# Patient Record
Sex: FEMALE | Race: WHITE | HISPANIC OR LATINO | Employment: FULL TIME | URBAN - METROPOLITAN AREA
[De-identification: names, ages, dates, MRNs, and addresses within clinical notes are randomized per-mention and may not be internally consistent; named-entity substitution may affect disease eponyms.]

---

## 2017-02-26 ENCOUNTER — HOSPITAL ENCOUNTER (EMERGENCY)
Facility: HOSPITAL | Age: 32
Discharge: HOME | End: 2017-02-26
Attending: EMERGENCY MEDICINE
Payer: COMMERCIAL

## 2017-02-26 VITALS
TEMPERATURE: 98 F | DIASTOLIC BLOOD PRESSURE: 56 MMHG | HEART RATE: 72 BPM | SYSTOLIC BLOOD PRESSURE: 115 MMHG | HEIGHT: 62 IN | RESPIRATION RATE: 18 BRPM | OXYGEN SATURATION: 97 % | WEIGHT: 175 LBS | BODY MASS INDEX: 32.2 KG/M2

## 2017-02-26 DIAGNOSIS — M54.41 CHRONIC BILATERAL LOW BACK PAIN WITH RIGHT-SIDED SCIATICA: Primary | ICD-10-CM

## 2017-02-26 DIAGNOSIS — G89.29 CHRONIC BILATERAL LOW BACK PAIN WITH RIGHT-SIDED SCIATICA: Primary | ICD-10-CM

## 2017-02-26 LAB
B-HCG UR QL: NEGATIVE
BACTERIA #/AREA URNS HPF: NORMAL /HPF
BILIRUB UR QL STRIP: NEGATIVE
CLARITY UR: ABNORMAL
COLOR UR: YELLOW
CTP QC/QA: YES
GLUCOSE UR QL STRIP: NEGATIVE
HGB UR QL STRIP: ABNORMAL
KETONES UR QL STRIP: NEGATIVE
LEUKOCYTE ESTERASE UR QL STRIP: NEGATIVE
MICROSCOPIC COMMENT: NORMAL
NITRITE UR QL STRIP: NEGATIVE
PH UR STRIP: 6 [PH] (ref 5–8)
PROT UR QL STRIP: NEGATIVE
RBC #/AREA URNS HPF: 2 /HPF (ref 0–4)
SP GR UR STRIP: >=1.03 (ref 1–1.03)
SQUAMOUS #/AREA URNS HPF: 2 /HPF
URN SPEC COLLECT METH UR: ABNORMAL
UROBILINOGEN UR STRIP-ACNC: 1 EU/DL
WBC #/AREA URNS HPF: 0 /HPF (ref 0–5)

## 2017-02-26 PROCEDURE — 96372 THER/PROPH/DIAG INJ SC/IM: CPT

## 2017-02-26 PROCEDURE — 81025 URINE PREGNANCY TEST: CPT | Performed by: EMERGENCY MEDICINE

## 2017-02-26 PROCEDURE — 99283 EMERGENCY DEPT VISIT LOW MDM: CPT | Mod: 25

## 2017-02-26 PROCEDURE — 81000 URINALYSIS NONAUTO W/SCOPE: CPT

## 2017-02-26 PROCEDURE — 63600175 PHARM REV CODE 636 W HCPCS: Performed by: PHYSICIAN ASSISTANT

## 2017-02-26 RX ORDER — DIAZEPAM 5 MG/1
5 TABLET ORAL EVERY 6 HOURS PRN
Qty: 10 TABLET | Refills: 0 | Status: SHIPPED | OUTPATIENT
Start: 2017-02-26 | End: 2017-02-27 | Stop reason: ALTCHOICE

## 2017-02-26 RX ORDER — IBUPROFEN 600 MG/1
600 TABLET ORAL EVERY 6 HOURS PRN
Qty: 20 TABLET | Refills: 0 | Status: SHIPPED | OUTPATIENT
Start: 2017-02-26 | End: 2017-02-27 | Stop reason: ALTCHOICE

## 2017-02-26 RX ORDER — KETOROLAC TROMETHAMINE 30 MG/ML
30 INJECTION, SOLUTION INTRAMUSCULAR; INTRAVENOUS
Status: COMPLETED | OUTPATIENT
Start: 2017-02-26 | End: 2017-02-26

## 2017-02-26 RX ORDER — DIAZEPAM 10 MG/2ML
5 INJECTION INTRAMUSCULAR
Status: COMPLETED | OUTPATIENT
Start: 2017-02-26 | End: 2017-02-26

## 2017-02-26 RX ADMIN — DIAZEPAM 5 MG: 5 INJECTION, SOLUTION INTRAMUSCULAR; INTRAVENOUS at 11:02

## 2017-02-26 RX ADMIN — KETOROLAC TROMETHAMINE 30 MG: 30 INJECTION, SOLUTION INTRAMUSCULAR at 11:02

## 2017-02-26 NOTE — ED PROVIDER NOTES
"Encounter Date: 2/26/2017       History     Chief Complaint   Patient presents with    Back Pain     lower back pain and radiates to lower pelvic area and bilateral knees; states has history of sciatica; described as "spasms"; took ibuprofen, naproxen and tylenol and flexeril with no relief; denies urinary symptoms     Review of patient's allergies indicates:  No Known Allergies  HPI Comments:  Harsha Asi 31 y.o. nontoxic/afebrile female presented to the ED with C/O low back pain for the past four months. She denies any trauma to the area and reports that the pain is a spasm sensation that waxes and wanes in intensity. She states that pain is exacerbated by certain movements, bending and prolonged sitting and now radiates to the right gluteal region. She states that she does have left hip pain secondary to her changes gait. She is followed by a chiropractor with some relief of symptoms but has not seen PCP regarding this pain. She denies any numbness, tingling, fever, chills, abdominal pain, GI, or  symptoms, bowel or bladder incontinence. She took aleve, flexeril and heating pad with minimal relief.    The history is provided by the patient.     Past Medical History:   Diagnosis Date    Diabetes mellitus      History reviewed. No pertinent surgical history.  Family History   Problem Relation Age of Onset    Diabetes Father     Hypertension Father     Diabetes Mother      Social History   Substance Use Topics    Smoking status: Never Smoker    Smokeless tobacco: Never Used    Alcohol use No     Review of Systems   Constitutional: Positive for activity change. Negative for appetite change, chills and fever.   HENT: Negative for sore throat.    Respiratory: Negative for cough and shortness of breath.    Cardiovascular: Negative for chest pain.   Gastrointestinal: Negative for abdominal pain, constipation, diarrhea, nausea and vomiting.   Genitourinary: Negative for dysuria, flank pain, pelvic pain, vaginal " bleeding and vaginal discharge.   Musculoskeletal: Positive for back pain and gait problem. Negative for arthralgias, joint swelling, neck pain and neck stiffness.   Skin: Negative for rash.   Neurological: Negative for weakness and numbness.   Hematological: Does not bruise/bleed easily.       Physical Exam   Initial Vitals   BP Pulse Resp Temp SpO2   02/26/17 1002 02/26/17 1002 02/26/17 1002 02/26/17 1002 02/26/17 1002   139/78 96 18 98.1 °F (36.7 °C) 98 %     Physical Exam    Nursing note and vitals reviewed.  Constitutional: Vital signs are normal. She appears well-developed and well-nourished. She is cooperative.  Non-toxic appearance. She does not appear ill. No distress.   HENT:   Head: Normocephalic and atraumatic.   Eyes: Conjunctivae and lids are normal.   Neck: Neck supple. No rigidity.   Cardiovascular: Normal rate and regular rhythm.   Pulmonary/Chest: Breath sounds normal. No respiratory distress. She has no wheezes. She has no rhonchi.   Abdominal: Soft. Normal appearance and bowel sounds are normal. There is no tenderness. There is no rigidity and no guarding.   Musculoskeletal:        Lumbar back: She exhibits tenderness, pain and spasm. She exhibits normal range of motion, no bony tenderness, no swelling and no edema.        Back:    Negative straight leg raise; no midline tenderness or bony deformity   Neurological: She is alert and oriented to person, place, and time. She has normal strength. No sensory deficit. GCS eye subscore is 4. GCS verbal subscore is 5. GCS motor subscore is 6.   Skin: Skin is warm, dry and intact. No rash noted.   Psychiatric: She has a normal mood and affect. Her speech is normal and behavior is normal. Thought content normal.         ED Course   Procedures  Labs Reviewed   URINALYSIS - Abnormal; Notable for the following:        Result Value    Appearance, UA Hazy (*)     Specific Gravity, UA >=1.030 (*)     Occult Blood UA 1+ (*)     All other components within normal  limits   URINALYSIS MICROSCOPIC   POCT URINE PREGNANCY       Dhyam Asi 31 y.o. nontoxic/afebrile female presented to the ED with C/O low back pain for the past four months. She denies any trauma to the area and reports that the pain is a spasm sensation that waxes and wanes in intensity. She states that pain is exacerbated by certain movements, bending and prolonged sitting and now radiates to the right gluteal region. She states that she does have left hip pain secondary to her changes gait. She is followed by a chiropractor with some relief of symptoms but has not seen PCP regarding this pain. She denies any numbness, tingling, fever, chills, abdominal pain, GI, or  symptoms, bowel or bladder incontinence. She took aleve, flexeril and heating pad with minimal relief. ROS positive for back pain.  Physical exam reveals patient well appearing in no distress exhibiting smooth steady gait to room. FROM of neck and all extremities with strength 5/55 bilaterally. TTP of the right lower paraspinal muscles with spasm noted; no midline tenderness, bony deformity or step-off. Lungs clear, heart regular rate and rhythm. Abdomen is soft and nontender with no rebound or rigidity.     DDX: back spasm, fracture, dislocation    ED management: no imaging at this time as low suspicion for acute bony deformity. We will treat with symptomatic medications for back spasm. Encouraged rest, hot soaks, massage and follow up for possible outpatient MRI and will refer to PT    Impression/Plan: The encounter diagnosis was Chronic bilateral low back pain with right-sided sciatica.  Discharged with valium and motrin.  Patient will follow up with Primary.  Patient cautioned on when to return to ED.  Pt. Understands and agrees with current treatment plan                        Attending Attestation:     Physician Attestation Statement for NP/PA:   I have conducted a face to face encounter with this patient in addition to the NP/PA, due to NP/PA  Request    Other NP/PA Attestation Additions:    History of Present Illness: 31-year-old female with back pain.  Pain began 4 months ago.  No trauma.  Right   Physical Exam: Tenderness of the right lower lumbar paraspinous muscle extending onto the right upper buttock.                  ED Course     Clinical Impression:   The encounter diagnosis was Chronic bilateral low back pain with right-sided sciatica.          ZULEMA Ornelas  02/26/17 1300       Lauri Lopez MD  02/26/17 6333

## 2017-02-26 NOTE — DISCHARGE INSTRUCTIONS
Consejos Para El Cuidado De La Espalda [Back Care Tips]    Hay ciertas cosas que usted puede hacer para prevenir la recurrencia del dolor de espalda velasquez y reducir los síntomas del dolor de espalda crónico:  · Mantenga un peso saludable. Si tiene sobrepeso, perder peso le ayudará a aliviar la mayoría de los jeri de espalda.  · El ejercicio es fariba parte importante de la recuperación de los jeri de espalda. La espalda está soportada por los músculos que se encuentran detrás y gume de la columna vertebral. [place] significa que los músculos de la espalda y del abdomen deben fortalecerse para quinn mejor soporte a la columna vertebral.   · Nadar y caminar a paso rápido son buenas formas de ejercicio para mejorar santana nivel de forma física.  · Practique técnicas seguras para levantar objetos (simón la información que se presenta más abajo).  · Adopte posturas correctas al estar sentado, parado o caminando. Evite permanecer sentado asiya períodos largos, ya que esto implica más esfuerzo en la parte inferior de la espalda que cuando está de pie o caminando.  · Póngase zapatos de buena calidad con suficiente soporte del arco. La alineación de los pies y de los tobillos puede afectar los síntomas de la espalda. Las mujeres deben evitar los zapatos de tacón alto.  · Los masajes terapéuticos pueden ayudar a aliviar el dolor de espalda crónico.  · Asiya los primeros dos días después de fariba lesión severa o un ataque de dolor crónico de espalda, aplique fariba bolsa de hielo sobre la bertha dolorida asiya 20 minutos cada 2-4 horas. [place] reducirá la hinchazón y el dolor. El calor (duchas o ophelia en [place] o fariba almohadilla calefactora) ayudan a reducir los espasmos musculares. Puede comenzar aplicando hielo y luego aplicar calor al cabo de dos días. Algunos pacientes se sienten mejor alternando los tratamientos con hielo y con calor. Utilice el método que le dé mejor resultado.  · Puede usar acetaminofén (Tylenol) o  ibuprofeno (Motrin o Advil) para controlar el dolor, a menos que le hayan recetado otro medicamento.[NOTA: Si tiene fariba enfermedad hepática o renal crónica, o ha tenido alguna vez fariba úlcera estomacal o sangrado gastrointestinal, consulte con santana médico antes de maria del carmen estos medicamentos.]     Estiramiento Lumbar  Brea es un ejercicio simple de estiramiento que le ayudará a relajar los espasmos musculares y a mantener la espalda más flexible. Si el ejercicio le empeora el dolor de espalda, deje de hacerlo.  · Acuéstese boca arriba con las rodillas flexionadas y ambos pies apoyados en el piso.  · Levante lentamente la rodilla izquierda hacia el pecho, con la espalda plana contra el piso. Sostenga la posición asiya 5 segundos.  · Relájese y repita el ejercicio con la rodilla derecha.  · Repita el ejercicio 10 veces con cada pierna.  Técnica Para Levantar Objetos De Forma Álvarez  · No doble la cintura para levantar objetos del suelo. En vez de hacer eso, agáchese doblando las rodillas y las caderas.   · Mantenga la espalda recta y la yanet erguida.   · Agarre el objeto cerca del cuerpo, directamente frente a usted.  · Enderece las piernas para levantar el objeto.   · Para bajar el objeto, siga la misma técnica en orden inverso.  · Si tiene que arrastrar un objeto por el suelo, empújelo.  Consejos Para Mantener Fariba [place] Postura  SENTADO  Siéntese en pooja con respaldos rectos o con soporte para la parte inferior de la espalda. Mantenga las rodillas un poco más elevadas que las caderas. En liam necesario, use un banquito o taburete bajo para mantener los pies apoyados sobre fariba superficie sólida.  Siéntese con la espalda recta mientras maneja. Ajuste el asiento hacia adelante para no tener que inclinarse hacia el volante. Fariba almohada pequeña o fariba toalla enrollada detrás de la parte inferior de la espalda podría ser útil para conducir en viajes largos.   DE PIE  Cuando tenga que estar parado (de pie) asiya largos  períodos apoye la mayor parte del peso sobre fariba pierna, cambiando de pierna cada pocos minutos.   AL DORMIR  La mejor manera de dormir es de lado, con las rodillas dobladas. Apoye la yanet en fariba almohada baja para quinn soporte al benito de forma que la columna vertebral quede en posición neutral. Evite las almohadas demasiado gruesas que doblan el benito hacia un lado. [place] fariba almohada entre las piernas para relajar más la parte inferior de la espalda. Si duerme boca arriba, ponga almohadas debajo de las rodillas para mantener las piernas ligeramente flexionadas. Use un colchón firme. Si santana colchón se hunde en la parte donde duerme, cámbielo o use fariba tabla de gann contrachapada de media pulgada de espesor debajo del colchón para darle más soporte.  Programe fariba VISITA DE CONTROL con santana médico, o según le indique nuestro personal médico.  [NOTA: Si le hicieron radiografías, tomografías computarizadas o resonancias magnéticas, estas serán examinadas por un radiólogo y le informarán de los nuevos hallazgos que puedan afectar la atención médica que necesita.]  Regrese Sin Demora  o llame al médico si nota alguno de los siguientes síntomas:  · El dolor empeora o se extiende a los brazos o a las piernas  · Debilidad o entumecimiento en carly o ambos brazos o piernas.  · Pérdida de control del intestino o de la vejiga  · Entumecimiento en la bertha de las ingles  Date Last Reviewed: 6/1/2016  © 1211-0499 [email or web address]. [address] [place] [address], [place] [address]. Todos los derechos reservados. Esta información no pretende sustituir la atención médica profesional. Sólo santana médico puede diagnosticar y tratar un problema de jay.          Ejercicios Para Fortalecer La Parte Baja De La Espalda [Back Exercises]  Si están ranjit, los músculos de la parte baja de la espalda y los abdominales pueden actuar conjuntamente para brindar un buen apoyo a la columna. Los ejercicios descritos a continuación le ayudarán a  fortalecer los músculos de la parte baja de la espalda. Es importante que comience a hacer ejercicios lentamente y que aumente los niveles de intensidad poco a poco.  Comience siempre cualquier programa de ejercicios estirando los músculos. Si siente dolor mientras hace alguno de estos ejercicios, deténgase y consulte con santana médico, para que le recomiende un programa específico más adecuado a santana nivel de forma física.  Estiramiento De La Parte Baja De La Espalda  · Acuéstese boca arriba con las rodillas flexionadas y ambos pies apoyados en el piso.  · Levante lentamente la rodilla izquierda hacia el pecho, conforme aplana la espalda contra el piso. Sostenga esta posición asiya 5 segundos.  · Relájese y repita el ejercicio con la rodilla derecha.  · Rigoberto juan ejercicio 10 veces con cada pierna.  · Repita el ejercicio abrazando las dos rodillas y presionándolas contra el pecho al mismo tiempo.  Desarrollo De La Fuerza En La Parte Baja De La Espalda  1. Extensión lumbar de rodillas: Comience en posición de cuatro patas. Levante y enderece simultáneamente el brazo derecho y la pierna izquierda hasta que ambos miembros estén paralelos al suelo. Sostenga esta posición asiya 2 segundos y vuelva lentamente al punto de stefani. Repita el ejercicio con el brazo nicole y la pierna derecha. Alterne las posiciones 10 veces.  2. Extensión lumbar boca abajo: Acuéstese boca abajo, con los brazos extendidos hacia arriba y las svetlana contra el piso. Levante simultáneamente santana brazo derecho y santana pierna izquierda hasta donde pueda elevarlos sin sentir molestias. Sostenga esta posición asiya 10 segundos y vuelva lentamente al punto de stefani. Repita el ejercicio con el brazo nicole y la pierna derecha. Alterne las posiciones 10 veces. Prolongue gradualmente juan ejercicio hasta repetirlo 20 veces. (Nivel avanzado: Repita juan ejercicio levantando los dos brazos y las dos piernas al mismo tiempo hasta que estén a unos  cuantos centímetros del piso. Sostenga esta posición por 5 segundos y relájese.)  3. Inclinación pélvica: Acuéstese boca arriba en el suelo, con las rodillas flexionadas a 90 grados. Deje los pies apoyados contra el piso. Inspire, espire y luego contraiga lentamente los músculos abdominales llevando el ombligo hacia la columna vertebral. Deje que la pelvis se mueva hacia atrás hasta que la parte baja de la espalda esté completamente apoyada en el piso. Sostenga esta posición asiya 10 segundos mientras respira normalmente.  4. Abdominales cortos: Realice un ejercicio de inclinación pélvica (explicado arriba) apoyando la parte baja de la espalda contra el suelo. Mientras mantiene la tensión de joaquin músculos abdominales, respire fariba vez más y levante los omóplatos del piso (esta posición no equivale a la de sentadilla completa). Mantenga la yanet alineada con el mauricio del cuerpo (no doble el benito hacia gume). Sostenga la posición por 2 segundos, luego baje lentamente.  Date Last Reviewed: 6/1/2016  © 2366-4918 [employer]. [address] [place] [id number]. Todos los derechos reservados. Esta información no pretende sustituir la atención médica profesional. Sólo santana médico puede diagnosticar y tratar un problema de jay.

## 2017-02-26 NOTE — ED TRIAGE NOTES
"lower back pain and radiates to lower pelvic area and bilateral knees; states has history of sciatica; described as "spasms"; took ibuprofen, naproxen and tylenol and flexeril with no relief; denies urinary symptoms  Pt states symptoms have been intermittent x 1 mth. 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   "

## 2017-02-26 NOTE — ED AVS SNAPSHOT
[facility]  [address]  [place] [address]               Dhyam Asi   2017 10:08 AM   ED    Descripción:  Female : 1985   Departamento:  [facility]           Garcia Cuidado fue coordinado por:     Provider Role From To    Lauri Lopez MD Attending Provider [phone] --    ZULEMA Ornelas Physician Assistant 17 [phone] --      Razón de la erick     Back Pain           Diagnósticos de Esta Visita        Comentarios    Chronic bilateral low back pain with right-sided sciatica    -  Primario       ED Disposition     Ninguna           Lista de tareas           Información de seguimiento     Realice un seguimiento con:  Amadeo Orozco MD    Cómo:  Ir a    Cuándo:  3/5/2017    Especialidad:  Internal Medicine    Información de contacto:    [address] [place] [address] [place] [address]  [phone]        Recetas para recoger        Disp Refills Start End    ibuprofen (ADVIL,MOTRIN) 600 MG tablet 20 tablet 0 2017     Take 1 tablet (600 mg total) by mouth every 6 (six) hours as needed for Pain. - Oral    [facility]: [facility] [address] [place] [address] No. de tlfo: #: [phone]       diazePAM (VALIUM) 5 MG tablet 10 tablet 0 2017 3/28/2017    Take 1 tablet (5 mg total) by mouth every 6 (six) hours as needed (muslce relaxant). - Oral    [facility]: [facility] [address] [place] [address] No. de tlfo: #: [phone]         [facility]     [facility] L[facility] de Enfermeras - Asistencia   Enfermeras registradas de [facility] pueden ayudarle a reservar fariba erick, proveer educación para la jay, asesoría clínica, y otros servicios de asesoramiento.   Llame para juan servicio gratuito a [phone].             Medicamentos           Mensaje sobre Medicamentos     Verificar los cambios y / o adiciones a garcia régimen de medicación son los mismos que discutir con garcia médico. Si cualquiera de  estos cambios o adiciones son incorrectos, por favor notifique a santana proveedor de atención médica.        EMPEZAR a maria del carmen estos medicamentos NUEVOS        Refills    ibuprofen (ADVIL,MOTRIN) 600 MG tablet 0    Sig: Take 1 tablet (600 mg total) by mouth every 6 (six) hours as needed for Pain.    Categoría: Print    Vía: Oral    diazePAM (VALIUM) 5 MG tablet 0    Sig: Take 1 tablet (5 mg total) by mouth every 6 (six) hours as needed (muslce relaxant).    Categoría: Print    Vía: Oral      These medications were administered today        Dose Freq    ketorolac injection 30 mg 30 mg ED 1 Time    Sig: Inject 30 mg into the muscle ED 1 Time.    Categoría: Normal    Vía: Intramuscular    Cofirmante de órdenes: Required by Lauri Lopez MD    diazePAM injection 5 mg 5 mg ED 1 Time    Sig: Inject 1 mL (5 mg total) into the muscle ED 1 Time.    Categoría: Normal    Vía: Intramuscular    Cofirmante de órdenes: Required by Lauri Lopez MD           Verifique que la siguiente lista de medicamentos es fariba representación exacta de los medicamentos que está tomando actualmente. Si no hay ningunos reportados, la lista puede estar en randolph. Si no es correcta, por favor póngase en contacto con santana proveedor de atención médica. Lleve esta lista con usted en liam de emergencia.           Medicamentos Actuales     diazePAM (VALIUM) 5 MG tablet Take 1 tablet (5 mg total) by mouth every 6 (six) hours as needed (muslce relaxant).    diazePAM injection 5 mg Inject 1 mL (5 mg total) into the muscle ED 1 Time.    drospirenone-ethinyl estradiol (RACHEL, 28,) 3-0.03 mg per tablet Take 1 tablet by mouth once daily.    ibuprofen (ADVIL,MOTRIN) 600 MG tablet Take 1 tablet (600 mg total) by mouth every 6 (six) hours as needed for Pain.    ketorolac injection 30 mg Inject 30 mg into the muscle ED 1 Time.           Información de referencia clínica           Tomasa signos vitales laurel     PS                   139/78           Alergias     A  partir del:  2/26/2017        No Known Allergies      Vacunas     Administradas en la fecha de la visita:  2/26/2017        None      ED Micro, Lab, POCT     Start Ordered       Status Ordering Provider    02/26/17 1012 02/26/17 1012  POCT urine pregnancy  Once      Final result     02/26/17 1012 02/26/17 1012  Urinalysis  STAT      Final result     02/26/17 1012 02/26/17 1012  Urinalysis Microscopic  Once      Final result       ED Imaging Orders     None        Instrucciones a quinn de enrique         Consejos Para El Cuidado De La Espalda [Back Care Tips]    Hay ciertas cosas que usted puede hacer para prevenir la recurrencia del dolor de espalda velasquez y reducir los síntomas del dolor de espalda crónico:  · Mantenga un peso saludable. Si tiene sobrepeso, perder peso le ayudará a aliviar la mayoría de los jeri de espalda.  · El ejercicio es fariba parte importante de la recuperación de los jeri de espalda. La espalda está soportada por los músculos que se encuentran detrás y gume de la columna vertebral. [place] significa que los músculos de la espalda y del abdomen deben fortalecerse para quinn mejor soporte a la columna vertebral.   · Nadar y caminar a paso rápido son buenas formas de ejercicio para mejorar santana nivel de forma física.  · Practique técnicas seguras para levantar objetos (simón la información que se presenta más abajo).  · Adopte posturas correctas al estar sentado, parado o caminando. Evite permanecer sentado asiya períodos largos, ya que esto implica más esfuerzo en la parte inferior de la espalda que cuando está de pie o caminando.  · Póngase zapatos de buena calidad con suficiente soporte del arco. La alineación de los pies y de los tobillos puede afectar los síntomas de la espalda. Las mujeres deben evitar los zapatos de tacón alto.  · Los masajes terapéuticos pueden ayudar a aliviar el dolor de espalda crónico.  · Asiya los primeros dos días después de fariba lesión severa o un ataque de dolor  crónico de espalda, aplique fariba bolsa de hielo sobre la bertha dolorida asiya 20 minutos cada 2-4 horas. Zemple reducirá la hinchazón y el dolor. El calor (duchas o ophelia en [employer] o fariba almohadilla calefactora) ayudan a reducir los espasmos musculares. Puede comenzar aplicando hielo y luego aplicar calor al cabo de dos días. Algunos pacientes se sienten mejor alternando los tratamientos con hielo y con calor. Utilice el método que le dé mejor resultado.  · Puede usar acetaminofén (Tylenol) o ibuprofeno (Motrin o Advil) para controlar el dolor, a menos que le hayan recetado otro medicamento.[NOTA: Si tiene fariba enfermedad hepática o renal crónica, o ha tenido alguna vez fariba úlcera estomacal o sangrado gastrointestinal, consulte con santana médico antes de maria del carmen estos medicamentos.]     Estiramiento Lumbar  Brea es un ejercicio simple de estiramiento que le ayudará a relajar los espasmos musculares y a mantener la espalda más flexible. Si el ejercicio le empeora el dolor de espalda, deje de hacerlo.  · Acuéstese boca arriba con las rodillas flexionadas y ambos pies apoyados en el piso.  · Levante lentamente la rodilla izquierda hacia el pecho, con la espalda plana contra el piso. Sostenga la posición asiya 5 segundos.  · Relájese y repita el ejercicio con la rodilla derecha.  · Repita el ejercicio 10 veces con cada pierna.  Técnica Para Levantar Objetos De Forma Álvarez  · No doble la cintura para levantar objetos del suelo. En vez de hacer eso, agáchese doblando las rodillas y las caderas.   · Mantenga la espalda recta y la yanet erguida.   · Agarre el objeto cerca del cuerpo, directamente frente a usted.  · Enderece las piernas para levantar el objeto.   · Para bajar el objeto, siga la misma técnica en orden inverso.  · Si tiene que arrastrar un objeto por el suelo, empújelo.  Consejos Para Mantener Fariba [place] Postura  SENTADO  Siéntese en pooja con respaldos rectos o con soporte para la parte inferior de la  espalda. Mantenga las rodillas un poco más elevadas que las caderas. En liam necesario, use un banquito o taburete bajo para mantener los pies apoyados sobre milli superficie sólida.  Siéntese con la espalda recta mientras maneja. Ajuste el asiento hacia adelante para no tener que inclinarse hacia el volante. Milli almohada pequeña o milli toalla enrollada detrás de la parte inferior de la espalda podría ser útil para conducir en viajes largos.   DE PIE  Cuando tenga que estar parado (de pie) asiya largos períodos apoye la mayor parte del peso sobre milli pierna, cambiando de pierna cada pocos minutos.   AL DORMIR  La mejor manera de dormir es de lado, con las rodillas dobladas. Apoye la yanet en milli almohada baja para quinn soporte al benito de forma que la columna vertebral quede en posición neutral. Evite las almohadas demasiado gruesas que doblan el benito hacia un lado. Ponga milli almohada entre las piernas para relajar más la parte inferior de la espalda. Si duerme boca arriba, ponga almohadas debajo de las rodillas para mantener las piernas ligeramente flexionadas. Use un colchón firme. Si santana colchón se hunde en la parte donde duerme, cámbielo o use milli tabla de gann contrachapada de media pulgada de espesor debajo del colchón para darle más soporte.  Programe milli VISITA DE CONTROL con santana médico, o según le indique nuestro personal médico.  [NOTA: Si le hicieron radiografías, tomografías computarizadas o resonancias magnéticas, estas serán examinadas por un radiólogo y le informarán de los nuevos hallazgos que puedan afectar la atención médica que necesita.]  Regrese Sin Demora  o llame al médico si nota alguno de los siguientes síntomas:  · El dolor empeora o se extiende a los brazos o a las piernas  · Debilidad o entumecimiento en carly o ambos brazos o piernas.  · Pérdida de control del intestino o de la vejiga  · Entumecimiento en la bertha de las ingles  Date Last Reviewed: 6/1/2016  © 5337-1207 [employer] [address] [place] [address]. Todos los derechos reservados. Esta información no pretende sustituir la atención médica profesional. Sólo santana médico puede diagnosticar y tratar un problema de jay.          Ejercicios Para Fortalecer La Parte Baja De La Espalda [Back Exercises]  Si están ranjit, los músculos de la parte baja de la espalda y los abdominales pueden actuar conjuntamente para brindar un buen apoyo a la columna. Los ejercicios descritos a continuación le ayudarán a fortalecer los músculos de la parte baja de la espalda. Es importante que comience a hacer ejercicios lentamente y que aumente los niveles de intensidad poco a poco.  Comience siempre cualquier programa de ejercicios estirando los músculos. Si siente dolor mientras hace alguno de estos ejercicios, deténgase y consulte con santana médico, para que le recomiende un programa específico más adecuado a santana nivel de forma física.  Estiramiento De La Parte Baja De La Espalda  · Acuéstese boca arriba con las rodillas flexionadas y ambos pies apoyados en el piso.  · Levante lentamente la rodilla izquierda hacia el pecho, conforme aplana la espalda contra el piso. Sostenga esta posición asiya 5 segundos.  · Relájese y repita el ejercicio con la rodilla derecha.  · Rigoberto juan ejercicio 10 veces con cada pierna.  · Repita el ejercicio abrazando las dos rodillas y presionándolas contra el pecho al mismo tiempo.  Desarrollo De La Fuerza En La Parte Baja De La Espalda  1. Extensión lumbar de rodillas: Comience en posición de cuatro patas. Levante y enderece simultáneamente el brazo derecho y la pierna izquierda hasta que ambos miembros estén paralelos al suelo. Sostenga esta posición asiya 2 segundos y vuelva lentamente al punto de stefani. Repita el ejercicio con el brazo nicole y la pierna derecha. Alterne las posiciones 10 veces.  2. Extensión lumbar boca abajo: Acuéstese boca abajo, con los brazos extendidos hacia arriba y las  svetlana contra el piso. Levante simultáneamente santana brazo derecho y santana pierna izquierda hasta donde pueda elevarlos sin sentir molestias. Sostenga esta posición asiya 10 segundos y vuelva lentamente al punto de stefani. Repita el ejercicio con el brazo nicole y la pierna derecha. Alterne las posiciones 10 veces. Prolongue gradualmente juan ejercicio hasta repetirlo 20 veces. (Nivel avanzado: Repita juan ejercicio levantando los dos brazos y las dos piernas al mismo tiempo hasta que estén a unos cuantos centímetros del piso. Sostenga esta posición por 5 segundos y relájese.)  3. Inclinación pélvica: Acuéstese boca arriba en el suelo, con las rodillas flexionadas a 90 grados. Deje los pies apoyados contra el piso. Inspire, espire y luego contraiga lentamente los músculos abdominales llevando el ombligo hacia la columna vertebral. Deje que la pelvis se mueva hacia atrás hasta que la parte baja de la espalda esté completamente apoyada en el piso. Sostenga esta posición asiya 10 segundos mientras respira normalmente.  4. Abdominales cortos: Realice un ejercicio de inclinación pélvica (explicado arriba) apoyando la parte baja de la espalda contra el suelo. Mientras mantiene la tensión de tomasa músculos abdominales, respire fariba vez más y levante los omóplatos del piso (esta posición no equivale a la de sentadilla completa). Mantenga la yanet alineada con el mauricio del cuerpo (no doble el benito hacia gume). Sostenga la posición por 2 segundos, luego baje lentamente.  Date Last Reviewed: 6/1/2016  © 6582-2321 [employer], [employer]. [address] [place] [address], [place] [address]. Todos los derechos reservados. Esta información no pretende sustituir la atención médica profesional. Sólo santana médico puede diagnosticar y tratar un problema de jay.          Tomasa Citas Programadas     Feb 27, 2017  1:00 PM CST   Physical with Eunice Faustin MD   [facility] ([place])    [address]  [place] [address] [phone]              Registrarse para [facility]     La activación de santana cuenta [facility] es tan fácil perri 1-2-3!    1) Ir a [email or web address], seleccione Registrarse Ahora, meter el código de activación y santana fecha de nacimiento, y seleccione Próximo.    [id number]  Expires: 3/23/2017  8:54 AM      2) Crear un nombre de usuario y contraseña para usar cuando se visita [facility] en el futuro y selecciona fariba pregunta de seguridad en liam de que pierda santana contraseña y seleccione Próximo.    3) Introduzca santana dirección de correo electrónico y melissa clic en Registrarse!    Información Adicional  Si tiene alguna pregunta, por favor, e-mail [email or web address] o llame al [phone] para hablar con nuestro personal. Recuerde, [facility] no debe ser usada para necesidades urgentes. En liam de emergencia médica, llame al [phone].         [facility] cumple con las leyes federales aplicables de derechos civiles y no discrimina por motivos de angy, color, origen nacional, edad, discapacidad, o sexo.        Language Assistance Services     ATTENTION: Language assistance services are available, free of charge. Please call [phone].      ATENCIÓN: Si habla español, tiene a santana disposición servicios gratuitos de asistencia lingüística. Llame al [phone].     [facility] Ý: N?u b?n nói Ti?ng Vi?t, có các d?ch v? h? tr? ngôn ng? mi?n phí dành cho b?n. G?i s? [phone].                      [facility] [address]  [place] [address]               [facility] Asi   2017 10:08 AM   ED    Description:  Female : 1985   Department:  [facility]           Your Care was Coordinated By:     Provider Role From To    Lauri Lopez MD Attending Provider [phone] --    ZULEMA Ornelas Physician Assistant 17 [phone] --      Reason for Visit     Back Pain           Diagnoses this Visit        Comments     Chronic bilateral low back pain with right-sided sciatica    -  Primary       ED Disposition     None           To Do List           Follow-up Information     Follow up with Amadeo Orozco MD. Go in 1 week.    Specialty:  Internal Medicine    Contact information:    [address] [id number] [phone]         These Medications        Disp Refills Start End    ibuprofen (ADVIL,MOTRIN) 600 MG tablet 20 tablet 0 2/26/2017     Take 1 tablet (600 mg total) by mouth every 6 (six) hours as needed for Pain. - Oral    Pharmacy: [facility] [address] #: [phone]       diazePAM (VALIUM) 5 MG tablet 10 tablet 0 2/26/2017 3/28/2017    Take 1 tablet (5 mg total) by mouth every 6 (six) hours as needed (muslce relaxant). - Oral    Pharmacy: [address] [place] [address] #: [phone]         [facility]     [facility] - 24/7 Assistance  Registered nurses in the [facility] provide clinical advisement, health education, appointment booking, and other advisory services.  Call for this free service at [phone].             Medications           Message regarding Medications     Verify the changes and/or additions to your medication regime listed below are the same as discussed with your clinician today.  If any of these changes or additions are incorrect, please notify your healthcare provider.        START taking these NEW medications        Refills    ibuprofen (ADVIL,MOTRIN) 600 MG tablet 0    Sig: Take 1 tablet (600 mg total) by mouth every 6 (six) hours as needed for Pain.    Class: Print    Route: Oral    diazePAM (VALIUM) 5 MG tablet 0    Sig: Take 1 tablet (5 mg total) by mouth every 6 (six) hours as needed (muslce relaxant).    Class: Print    Route: Oral      These medications were administered today        Dose Freq    ketorolac injection 30 mg 30 mg ED 1 Time    Sig: Inject 30 mg into the  muscle ED 1 Time.    Class: Normal    Route: Intramuscular    Cosign for Ordering: Required by Lauri Lopez MD    diazePAM injection 5 mg 5 mg ED 1 Time    Sig: Inject 1 mL (5 mg total) into the muscle ED 1 Time.    Class: Normal    Route: Intramuscular    Cosign for Ordering: Required by Lauri Lopez MD           Verify that the below list of medications is an accurate representation of the medications you are currently taking.  If none reported, the list may be blank. If incorrect, please contact your healthcare provider. Carry this list with you in case of emergency.           Current Medications     diazePAM (VALIUM) 5 MG tablet Take 1 tablet (5 mg total) by mouth every 6 (six) hours as needed (muslce relaxant).    diazePAM injection 5 mg Inject 1 mL (5 mg total) into the muscle ED 1 Time.    drospirenone-ethinyl estradiol (RACHEL, 28,) 3-0.03 mg per tablet Take 1 tablet by mouth once daily.    ibuprofen (ADVIL,MOTRIN) 600 MG tablet Take 1 tablet (600 mg total) by mouth every 6 (six) hours as needed for Pain.    ketorolac injection 30 mg Inject 30 mg into the muscle ED 1 Time.           Clinical Reference Information           Your Vitals Were     BP                   139/78           Allergies as of 2/26/2017     No Known Allergies      Immunizations Administered on Date of Encounter - 2/26/2017     None      ED Micro, Lab, POCT     Start Ordered       Status Ordering Provider    02/26/17 1012 02/26/17 1012  POCT urine pregnancy  Once      Final result     02/26/17 1012 02/26/17 1012  Urinalysis  STAT      Final result     02/26/17 1012 02/26/17 1012  Urinalysis Microscopic  Once      Final result       ED Imaging Orders     None        Discharge Instructions         Consejos Para El Cuidado De La Espalda [Back Care Tips]    Hay ciertas cosas que usted puede hacer para prevenir la recurrencia del dolor de espalda velasquez y reducir los síntomas del dolor de espalda crónico:  · Mantenga un peso  saludable. Si tiene sobrepeso, perder peso le ayudará a aliviar la mayoría de los jeri de espalda.  · El ejercicio es fariba parte importante de la recuperación de los jeri de espalda. La espalda está soportada por los músculos que se encuentran detrás y gume de la columna vertebral. Britt significa que los músculos de la espalda y del abdomen deben fortalecerse para quinn mejor soporte a la columna vertebral.   · Nadar y caminar a paso rápido son buenas formas de ejercicio para mejorar santana nivel de forma física.  · Practique técnicas seguras para levantar objetos (simón la información que se presenta más abajo).  · Adopte posturas correctas al estar sentado, parado o caminando. Evite permanecer sentado asiya períodos largos, ya que esto implica más esfuerzo en la parte inferior de la espalda que cuando está de pie o caminando.  · Póngase zapatos de buena calidad con suficiente soporte del arco. La alineación de los pies y de los tobillos puede afectar los síntomas de la espalda. Las mujeres deben evitar los zapatos de tacón alto.  · Los masajes terapéuticos pueden ayudar a aliviar el dolor de espalda crónico.  · Asiya los primeros dos días después de fariba lesión severa o un ataque de dolor crónico de espalda, aplique fariba bolsa de hielo sobre la bertha dolorida asiya 20 minutos cada 2-4 horas. Britt reducirá la hinchazón y el dolor. El calor (duchas o ophelia en [place] o fariba almohadilla calefactora) ayudan a reducir los espasmos musculares. Puede comenzar aplicando hielo y luego aplicar calor al cabo de dos días. Algunos pacientes se sienten mejor alternando los tratamientos con hielo y con calor. Utilice el método que le dé mejor resultado.  · Puede usar acetaminofén (Tylenol) o ibuprofeno (Motrin o Advil) para controlar el dolor, a menos que le hayan recetado otro medicamento.[NOTA: Si tiene fariba enfermedad hepática o renal crónica, o ha tenido alguna vez fariba úlcera estomacal o sangrado gastrointestinal,  consulte con santana médico antes de maria del carmen estos medicamentos.]     Estiramiento Lumbar  Brea es un ejercicio simple de estiramiento que le ayudará a relajar los espasmos musculares y a mantener la espalda más flexible. Si el ejercicio le empeora el dolor de espalda, deje de hacerlo.  · Acuéstese boca arriba con las rodillas flexionadas y ambos pies apoyados en el piso.  · Levante lentamente la rodilla izquierda hacia el pecho, con la espalda plana contra el piso. Sostenga la posición asiya 5 segundos.  · Relájese y repita el ejercicio con la rodilla derecha.  · Repita el ejercicio 10 veces con cada pierna.  Técnica Para Levantar Objetos De Forma Álvarez  · No doble la cintura para levantar objetos del suelo. En vez de hacer eso, agáchese doblando las rodillas y las caderas.   · Mantenga la espalda recta y la yanet erguida.   · Agarre el objeto cerca del cuerpo, directamente frente a usted.  · Enderece las piernas para levantar el objeto.   · Para bajar el objeto, siga la misma técnica en orden inverso.  · Si tiene que arrastrar un objeto por el suelo, empújelo.  Consejos Para Mantener Milli [place] Postura  SENTADO  Siéntese en pooja con respaldos rectos o con soporte para la parte inferior de la espalda. Mantenga las rodillas un poco más elevadas que las caderas. En liam necesario, use un banquito o taburete bajo para mantener los pies apoyados sobre milli superficie sólida.  Siéntese con la espalda recta mientras maneja. Ajuste el asiento hacia adelante para no tener que inclinarse hacia el volante. Milli almohada pequeña o milli toalla enrollada detrás de la parte inferior de la espalda podría ser útil para conducir en viajes largos.   DE PIE  Cuando tenga que estar parado (de pie) asiya largos períodos apoye la mayor parte del peso sobre milli pierna, cambiando de pierna cada pocos minutos.   AL DORMIR  La mejor manera de dormir es de lado, con las rodillas dobladas. Apoye la yanet en milli almohada baja para quinn soporte al  benito de forma que la columna vertebral quede en posición neutral. Evite las almohadas demasiado gruesas que doblan el benito hacia un lado. Ponga fariba almohada entre las piernas para relajar más la parte inferior de la espalda. Si duerme boca arriba, ponga almohadas debajo de las rodillas para mantener las piernas ligeramente flexionadas. Use un colchón firme. Si santana colchón se hunde en la parte donde duerme, cámbielo o use fariba tabla de gann contrachapada de media pulgada de espesor debajo del colchón para darle más soporte.  Programe fariba VISITA DE CONTROL con santana médico, o según le indique nuestro personal médico.  [NOTA: Si le hicieron radiografías, tomografías computarizadas o resonancias magnéticas, estas serán examinadas por un radiólogo y le informarán de los nuevos hallazgos que puedan afectar la atención médica que necesita.]  Regrese Sin Demora  o llame al médico si nota alguno de los siguientes síntomas:  · El dolor empeora o se extiende a los brazos o a las piernas  · Debilidad o entumecimiento en carly o ambos brazos o piernas.  · Pérdida de control del intestino o de la vejiga  · Entumecimiento en la bertha de las ingles  Date Last Reviewed: 6/1/2016  © 5897-6814 [employer]. [address] [place] [address], [place] [address]. Todos los derechos reservados. Esta información no pretende sustituir la atención médica profesional. Sólo santana médico puede diagnosticar y tratar un problema de jay.          Ejercicios Para Fortalecer La Parte Baja De La Espalda [Back Exercises]  Si están ranjit, los músculos de la parte baja de la espalda y los abdominales pueden actuar conjuntamente para brindar un buen apoyo a la columna. Los ejercicios descritos a continuación le ayudarán a fortalecer los músculos de la parte baja de la espalda. Es importante que comience a hacer ejercicios lentamente y que aumente los niveles de intensidad poco a poco.  Comience siempre cualquier programa de ejercicios estirando los  músculos. Si siente dolor mientras hace alguno de estos ejercicios, deténgase y consulte con santana médico, para que le recomiende un programa específico más adecuado a santana nivel de forma física.  Estiramiento De La Parte Baja De La Espalda  · Acuéstese boca arriba con las rodillas flexionadas y ambos pies apoyados en el piso.  · Levante lentamente la rodilla izquierda hacia el pecho, conforme aplana la espalda contra el piso. Sostenga esta posición asiya 5 segundos.  · Relájese y repita el ejercicio con la rodilla derecha.  · Rigoberto juan ejercicio 10 veces con cada pierna.  · Repita el ejercicio abrazando las dos rodillas y presionándolas contra el pecho al mismo tiempo.  Desarrollo De La Fuerza En La Parte Baja De La Espalda  1. Extensión lumbar de rodillas: Comience en posición de cuatro patas. Levante y enderece simultáneamente el brazo derecho y la pierna izquierda hasta que ambos miembros estén paralelos al suelo. Sostenga esta posición asiya 2 segundos y vuelva lentamente al punto de stefani. Repita el ejercicio con el brazo nicole y la pierna derecha. Alterne las posiciones 10 veces.  2. Extensión lumbar boca abajo: Acuéstese boca abajo, con los brazos extendidos hacia arriba y las svetlana contra el piso. Levante simultáneamente santana brazo derecho y santana pierna izquierda hasta donde pueda elevarlos sin sentir molestias. Sostenga esta posición asiya 10 segundos y vuelva lentamente al punto de stefani. Repita el ejercicio con el brazo nicole y la pierna derecha. Alterne las posiciones 10 veces. Prolongue gradualmente juan ejercicio hasta repetirlo 20 veces. (Nivel avanzado: Repita juan ejercicio levantando los dos brazos y las dos piernas al mismo tiempo hasta que estén a unos cuantos centímetros del piso. Sostenga esta posición por 5 segundos y relájese.)  3. Inclinación pélvica: Acuéstese boca arriba en el suelo, con las rodillas flexionadas a 90 grados. Deje los pies apoyados contra el piso. Inspire, espire  y luego contraiga lentamente los músculos abdominales llevando el ombligo hacia la columna vertebral. Deje que la pelvis se mueva hacia atrás hasta que la parte baja de la espalda esté completamente apoyada en el piso. Sostenga esta posición asiya 10 segundos mientras respira normalmente.  4. Abdominales cortos: Realice un ejercicio de inclinación pélvica (explicado arriba) apoyando la parte baja de la espalda contra el suelo. Mientras mantiene la tensión de joaquin músculos abdominales, respire fariba vez más y levante los omóplatos del piso (esta posición no equivale a la de sentadilla completa). Mantenga la yanet alineada con el mauricio del cuerpo (no doble el benito hacia gume). Sostenga la posición por 2 segundos, luego baje lentamente.  Date Last Reviewed: 6/1/2016  © 1722-8726 [employer]. [address] [place] [address]. Todos los derechos reservados. Esta información no pretende sustituir la atención médica profesional. Sólo santana médico puede diagnosticar y tratar un problema de jay.          Your Scheduled Appointments     Feb 27, 2017  1:00 PM CST   Physical with Eunice Faustin MD   [place] [facility]    [address] [facility]  [place] [address]   [phone]              [facility] Sign-Up     Activating your [facility] account is as easy as 1-2-3!     1) Visit [email or web address], select Sign Up Now, enter this activation code and your date of birth, then select Next.  [email or web address]  Expires: 3/23/2017  8:54 AM      2) Create a username and password to use when you visit [facility] in the future and select a security question in case you lose your password and select Next.    3) Enter your e-mail address and click Sign Up!    Additional Information  If you have questions, please e-mail [email or web address] or call [phone] to talk to our [facility] staff. Remember, [facility] is NOT to be used for urgent needs. For medical emergencies, dial 911.           [facility] complies with applicable Federal civil rights laws and does not discriminate on the basis of race, color, national origin, age, disability, or sex.        Language Assistance Services     ATTENTION: Language assistance services are available, free of charge. Please call [phone].      ATENCIÓN: Si habla español, tiene a santana disposición servicios gratuitos de asistencia lingüística. Llame al [phone].     CHÚ Ý: N?u b?n nói Ti?ng Vi?t, có các d?ch v? h? tr? ngôn ng? mi?n phí dành cho b?n. G?i s? [phone].

## 2017-02-27 ENCOUNTER — OFFICE VISIT (OUTPATIENT)
Dept: INTERNAL MEDICINE | Facility: CLINIC | Age: 32
End: 2017-02-27
Payer: COMMERCIAL

## 2017-02-27 ENCOUNTER — HOSPITAL ENCOUNTER (OUTPATIENT)
Dept: RADIOLOGY | Facility: HOSPITAL | Age: 32
Discharge: HOME | End: 2017-02-27
Attending: INTERNAL MEDICINE
Payer: COMMERCIAL

## 2017-02-27 VITALS
HEART RATE: 80 BPM | BODY MASS INDEX: 32.22 KG/M2 | SYSTOLIC BLOOD PRESSURE: 120 MMHG | TEMPERATURE: 57 F | RESPIRATION RATE: 14 BRPM | HEIGHT: 62 IN | DIASTOLIC BLOOD PRESSURE: 64 MMHG | WEIGHT: 175.06 LBS

## 2017-02-27 DIAGNOSIS — Z86.32 HISTORY OF GESTATIONAL DIABETES: ICD-10-CM

## 2017-02-27 DIAGNOSIS — M79.18 MUSCULOSKELETAL PAIN: ICD-10-CM

## 2017-02-27 DIAGNOSIS — M54.2 NECK PAIN ON RIGHT SIDE: ICD-10-CM

## 2017-02-27 DIAGNOSIS — M54.50 CHRONIC MIDLINE LOW BACK PAIN WITHOUT SCIATICA: ICD-10-CM

## 2017-02-27 DIAGNOSIS — Z00.00 ANNUAL PHYSICAL EXAM: Primary | ICD-10-CM

## 2017-02-27 DIAGNOSIS — G89.29 CHRONIC MIDLINE LOW BACK PAIN WITHOUT SCIATICA: ICD-10-CM

## 2017-02-27 DIAGNOSIS — R20.0 NUMBNESS OF TOES: ICD-10-CM

## 2017-02-27 DIAGNOSIS — E66.9 OBESITY (BMI 30.0-34.9): ICD-10-CM

## 2017-02-27 PROCEDURE — 72050 X-RAY EXAM NECK SPINE 4/5VWS: CPT | Mod: TC,PO

## 2017-02-27 PROCEDURE — 99385 PREV VISIT NEW AGE 18-39: CPT | Mod: S$GLB,,, | Performed by: INTERNAL MEDICINE

## 2017-02-27 PROCEDURE — 72050 X-RAY EXAM NECK SPINE 4/5VWS: CPT | Mod: 26,,, | Performed by: RADIOLOGY

## 2017-02-27 PROCEDURE — 99999 PR PBB SHADOW E&M-EST. PATIENT-LVL III: CPT | Mod: PBBFAC,,, | Performed by: INTERNAL MEDICINE

## 2017-02-27 PROCEDURE — 72110 X-RAY EXAM L-2 SPINE 4/>VWS: CPT | Mod: 26,,, | Performed by: RADIOLOGY

## 2017-02-27 PROCEDURE — 72110 X-RAY EXAM L-2 SPINE 4/>VWS: CPT | Mod: TC,PO

## 2017-02-27 RX ORDER — METHOCARBAMOL 750 MG/1
750 TABLET, FILM COATED ORAL 2 TIMES DAILY PRN
Qty: 30 TABLET | Refills: 0 | Status: SHIPPED | OUTPATIENT
Start: 2017-02-27 | End: 2017-03-09

## 2017-02-27 RX ORDER — DICLOFENAC SODIUM 75 MG/1
75 TABLET, DELAYED RELEASE ORAL 2 TIMES DAILY PRN
Qty: 30 TABLET | Refills: 0 | Status: SHIPPED | OUTPATIENT
Start: 2017-02-27 | End: 2017-04-07

## 2017-02-27 NOTE — PROGRESS NOTES
Subjective:      Harsha Castellanos is a 31 y.o.yo female who presents for annual exam.    Family History:  family history includes Breast cancer (age of onset: 42) in her paternal aunt; Cancer in her mother; Diabetes in her father and mother; Goiter in her maternal aunt; Hypertension in her father; Thyroid disease in her maternal aunt and paternal aunt.    Health Maintenance:  Health Maintenance       Date Due Completion Date    Lipid Panel 1985 ---    Hemoglobin A1c 1985 ---    Foot Exam 7/23/1995 ---    Eye Exam 7/23/1995 ---    Pap Smear 7/23/2006 ---    Influenza Vaccine 8/1/2016 ---    TETANUS VACCINE 2/26/2024 2/26/2014      OB/GYN 2015  Eye exam: not recently  Dental Exam: no issues    Exercise: limited by chronic back pain  Diet: overall healthy  Body mass index is 32.02 kg/(m^2).      Meds:   Current Outpatient Prescriptions:     diclofenac (VOLTAREN) 75 MG EC tablet, Take 1 tablet (75 mg total) by mouth 2 (two) times daily as needed., Disp: 30 tablet, Rfl: 0    drospirenone-ethinyl estradiol (RACHEL, 28,) 3-0.03 mg per tablet, Take 1 tablet by mouth once daily., Disp: 30 tablet, Rfl: 11    methocarbamol (ROBAXIN) 750 MG Tab, Take 1 tablet (750 mg total) by mouth 2 (two) times daily as needed., Disp: 30 tablet, Rfl: 0    PMHx:   Past Medical History:   Diagnosis Date    Gestational diabetes        PSHx:   No past surgical history on file.    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   HENT: Negative for congestion, dental  problem, ear discharge, ear pain, mouth sores, postnasal drip, rhinorrhea, sinus pressure, tinnitus and trouble swallowing.    Eyes: Negative for visual disturbance.   Respiratory: Negative for cough, chest tightness, shortness of breath and wheezing.    Cardiovascular: Negative for chest pain, palpitations and leg swelling.   Gastrointestinal: Negative for abdominal pain, blood in stool, constipation, diarrhea, nausea and vomiting.   Endocrine: Negative for cold intolerance, heat intolerance, polydipsia and polyuria.   Genitourinary: Negative for decreased urine volume, difficulty urinating, dysuria and hematuria.   Musculoskeletal: Positive for back pain (ER on 2/26 due to severe back pain, treated with ibuprofen and valium with minimal improvement, currently 6-7/10 in severity, radiates to bilateral groin and upper thighs, sees chiropractor, plans to begin PT) and neck pain (7/10 in severity, right sided). Negative for arthralgias and myalgias.   Skin: Negative for rash.   Neurological: Positive for numbness (intermittent toe numbness and tingling). Negative for dizziness, weakness, light-headedness and headaches.   Hematological: Negative for adenopathy.       Objective:      Physical Exam   Constitutional: She is oriented to person, place, and time. Vital signs are normal. She appears well-developed and well-nourished. No distress.   HENT:   Head: Normocephalic and atraumatic.   Right Ear: Hearing, tympanic membrane, external ear and ear canal normal. Tympanic membrane is not erythematous and not bulging.   Left Ear: Hearing, tympanic membrane, external ear and ear canal normal. Tympanic membrane is not erythematous and not bulging.   Nose: Nose normal.   Mouth/Throat: Uvula is midline, oropharynx is clear and moist and mucous membranes are normal. No oropharyngeal exudate or posterior oropharyngeal erythema.   Eyes: Conjunctivae, EOM and lids are normal. Pupils are equal, round, and reactive to light. No  scleral icterus.   Neck: Normal range of motion. Neck supple. No thyroid mass and no thyromegaly present.   Cardiovascular: Normal rate, regular rhythm, normal heart sounds and intact distal pulses.    No murmur heard.  Pulmonary/Chest: Effort normal and breath sounds normal. She has no wheezes.   Abdominal: Soft. Bowel sounds are normal. She exhibits no distension. There is no hepatosplenomegaly. There is no tenderness. There is no rigidity, no rebound and no guarding.   Musculoskeletal: Normal range of motion. She exhibits no edema.        Cervical back: She exhibits tenderness (inmusculature on right), pain and spasm.        Thoracic back: She exhibits no bony tenderness, no pain and no spasm.        Lumbar back: She exhibits bony tenderness, pain and spasm.   Negative straight leg raise   Lymphadenopathy:     She has no cervical adenopathy.        Right: No supraclavicular adenopathy present.        Left: No supraclavicular adenopathy present.   Neurological: She is alert and oriented to person, place, and time. She has normal strength and normal reflexes. She displays no tremor. No sensory deficit. Coordination and gait normal.   Skin: Skin is warm, dry and intact. No rash noted. She is not diaphoretic.   Psychiatric: She has a normal mood and affect.   Vitals reviewed.      Assessment:       1. Annual physical exam    2. Musculoskeletal pain    3. Chronic midline low back pain without sciatica    4. Numbness of toes    5. Neck pain on right side    6. Obesity (BMI 30.0-34.9)    7. History of gestational diabetes        Plan:       1,7. Ordered CBC, CMP, TSH, HbA1c, lipid panel and U/A. Up to date with vaccinations.   2,3,4. X-ray lumbar spine, stop ibuprofen, start voltaren 75mg twice daily as needed for pain. Also stop valium. Begin Robaxin 750mg up to bid as needed but advised not to drive after taking a dose since it may cause drowsiness. Has first PT session scheduled on 3/7.   5. c-spine x-ray  6. Patient  plans to resume exercise program after back pain resolves, will start PT    RTC in 1 year or sooner if needed    Eunice Faustin MD

## 2017-02-27 NOTE — MR AVS SNAPSHOT
[place] [facility]   [facility]  [place] [address]  Phone: [phone]  Fax: [phone]                  Dhyam Asi   2017 1:00 PM   Office Visit    Descripción:  Female : 1985   Personal Médico:  Eunice Faustin MD   Departamento:  [place] [facility]           Razón de la erick     Establish Care           Diagnósticos de Esta Visita        Comentarios    Annual physical exam    -  Primario     Chronic midline low back pain without sciatica         Neck pain on right side         Numbness of toes         History of gestational diabetes         Musculoskeletal pain                Lista de tareas           Citas próximas        Personal Médico Departamento Tfno del dpto    3/7/2017 3:30 PM Delgado Santiago PT [facility] [phone]      Metas (5 Years of Data)     Ninguna      Recetas para recoger        Disp Refills Start End    diclofenac (VOLTAREN) 75 MG EC tablet 30 tablet 0 2017     Take 1 tablet (75 mg total) by mouth 2 (two) times daily as needed. - Oral    [facility] [employer] [address] [place] [address] [address] AT [facility] No. de tlfo: #: [phone]       methocarbamol (ROBAXIN) 750 MG Tab 30 tablet 0 2017 3/9/2017    Take 1 tablet (750 mg total) by mouth 2 (two) times daily as needed. - Oral    [facility]: [facility] [employer] [address] [place] [address], [place] - [address] AT [facility] No. de tlfo: #: [phone]         [facility]     [facility] [place] [facility] Enfermeras - Asistencia   Enfermeras registradas de [facility] pueden ayudarle a reservar fariba erick, proveer educación para la jay, asesoría clínica, y otros servicios de asesoramiento.   Llame para juan servicio gratuito a [phone].             Medicamentos           Mensaje sobre Medicamentos     Verificar los cambios y / o adiciones a santana régimen de medicación son los mismos que  discutir con santana médico. Si cualquiera de estos cambios o adiciones son incorrectos, por favor notifique a santana proveedor de atención médica.        EMPEZAR a maria del carmen estos medicamentos NUEVOS        Refills    diclofenac (VOLTAREN) 75 MG EC tablet 0    Sig: Take 1 tablet (75 mg total) by mouth 2 (two) times daily as needed.    Categoría: Normal    Vía: Oral    methocarbamol (ROBAXIN) 750 MG Tab 0    Sig: Take 1 tablet (750 mg total) by mouth 2 (two) times daily as needed.    Categoría: Normal    Vía: Oral      DEJAR de maria del carmen estos medicamentos     ibuprofen (ADVIL,MOTRIN) 600 MG tablet Take 1 tablet (600 mg total) by mouth every 6 (six) hours as needed for Pain.    diazePAM (VALIUM) 5 MG tablet Take 1 tablet (5 mg total) by mouth every 6 (six) hours as needed (muslce relaxant).           Verifique que la siguiente lista de medicamentos es fariba representación exacta de los medicamentos que está tomando actualmente. Si no hay ningunos reportados, la lista puede estar en randolph. Si no es correcta, por favor póngase en contacto con santana proveedor de atención médica. Lleve esta lista con usted en liam de emergencia.           Medicamentos Actuales     diclofenac (VOLTAREN) 75 MG EC tablet Take 1 tablet (75 mg total) by mouth 2 (two) times daily as needed.    drospirenone-ethinyl estradiol (RACHEL, 28,) 3-0.03 mg per tablet Take 1 tablet by mouth once daily.    methocarbamol (ROBAXIN) 750 MG Tab Take 1 tablet (750 mg total) by mouth 2 (two) times daily as needed.           Información de referencia clínica           Tomasa signos vitales laurel     PS                   120/64 (BP Location: Left arm, Patient Position: Sitting, BP Method: Manual)           Blood Pressure          Most Recent Value    BP  120/64      Alergias     A partir del:  2/27/2017        No Known Allergies      Vacunas     Administradas en la fecha de la visita:  2/27/2017        None      Orders Placed During Today's Visit     Exámenes/Procedimientos futuros Se  orestes Espitia    CBC auto differential  2017    Comprehensive metabolic panel  2017    Hemoglobin A1c  2017    TSH  2017    X-Ray Cervical Spine Complete 5 view  2017    X-Ray Lumbar Spine Complete 5 View  2017    Urinalysis  As directed 2018      Language Assistance Services     ATTENTION: Language assistance services are available, free of charge. Please call [phone].      ATENCIÓN: Si habla español, tiene a santana disposición servicios gratuitos de asistencia lingüística. Llame al [phone].     CHÚ Ý: N?u b?n nói Ti?ng Vi?t, có các d?ch v? h? tr? ngôn ng? mi?n phí dành cho b?n. G?i s? [phone].         [place] [facility] cumple con las leyes federales aplicables de derechos civiles y no discrimina por motivos de angy, color, origen nacional, edad, discapacidad, o sexo.                 Dhyam Asi   2017 1:00 PM   Office Visit    Description:  Female : 1985   Provider:  Eunice Faustin MD   Department:  [place] [facility]           Reason for Visit     Establish Care           Diagnoses this Visit        Comments    Annual physical exam    -  Primary     Chronic midline low back pain without sciatica         Neck pain on right side         Numbness of toes         History of gestational diabetes         Musculoskeletal pain                To Do List           Future Appointments        Provider Department Dept Phone    3/7/2017 3:30 PM Delgado Santiago, PT [facility] [phone]      Goals     None       These Medications        Disp Refills Start End    diclofenac (VOLTAREN) 75 MG EC tablet 30 tablet 0 2017     Take 1 tablet (75 mg total) by mouth 2 (two) times daily as needed. - Oral    Pharmacy: [facility] - [place] AT [facility] [address] Ph #: [phone]       methocarbamol  (ROBAXIN) 750 MG Tab 30 tablet 0 2/27/2017 3/9/2017    Take 1 tablet (750 mg total) by mouth 2 (two) times daily as needed. - Oral    Pharmacy: [facility] [address] [place] [address] [address] AT [facility] #: [phone]         [facility] On Call     [facility] On Call Nurse Care Line - 24/7 Assistance  Registered nurses in the [facility] On Call Center provide clinical advisement, health education, appointment booking, and other advisory services.  Call for this free service at [phone].             Medications           Message regarding Medications     Verify the changes and/or additions to your medication regime listed below are the same as discussed with your clinician today.  If any of these changes or additions are incorrect, please notify your healthcare provider.        START taking these NEW medications        Refills    diclofenac (VOLTAREN) 75 MG EC tablet 0    Sig: Take 1 tablet (75 mg total) by mouth 2 (two) times daily as needed.    Class: Normal    Route: Oral    methocarbamol (ROBAXIN) 750 MG Tab 0    Sig: Take 1 tablet (750 mg total) by mouth 2 (two) times daily as needed.    Class: Normal    Route: Oral      STOP taking these medications     ibuprofen (ADVIL,MOTRIN) 600 MG tablet Take 1 tablet (600 mg total) by mouth every 6 (six) hours as needed for Pain.    diazePAM (VALIUM) 5 MG tablet Take 1 tablet (5 mg total) by mouth every 6 (six) hours as needed (muslce relaxant).           Verify that the below list of medications is an accurate representation of the medications you are currently taking.  If none reported, the list may be blank. If incorrect, please contact your healthcare provider. Carry this list with you in case of emergency.           Current Medications     diclofenac (VOLTAREN) 75 MG EC tablet Take 1 tablet (75 mg total) by mouth 2 (two) times daily as needed.    drospirenone-ethinyl estradiol (RACHEL, 28,) 3-0.03 mg per tablet Take 1 tablet by  mouth once daily.    methocarbamol (ROBAXIN) 750 MG Tab Take 1 tablet (750 mg total) by mouth 2 (two) times daily as needed.           Clinical Reference Information           Your Vitals Were     BP                   120/64 (BP Location: Left arm, Patient Position: Sitting, BP Method: Manual)           Blood Pressure          Most Recent Value    BP  120/64      Allergies as of 2/27/2017     No Known Allergies      Immunizations Administered on Date of Encounter - 2/27/2017     None      Orders Placed During Today's Visit     Future Labs/Procedures Expected by Expires    CBC auto differential  2/27/2017 4/28/2018    Comprehensive metabolic panel  2/27/2017 4/28/2018    Hemoglobin A1c  2/27/2017 4/28/2018    TSH  2/27/2017 4/28/2018    X-Ray Cervical Spine Complete 5 view  2/27/2017 2/27/2018    X-Ray Lumbar Spine Complete 5 View  2/27/2017 2/27/2018    Urinalysis  As directed 2/27/2018      Language Assistance Services     ATTENTION: Language assistance services are available, free of charge. Please call [phone].      ATENCIÓN: Si rickila padmini, tiene a santana disposición servicios gratuitos de asistencia lingüística. Llame al [phone].     CHÚ Ý: N?u b?n nói Ti?ng Vi?t, có các d?ch v? h? tr? ngôn ng? mi?n phí dành cho b?n. G?i s? [phone].         [place] [facility] complies with applicable Federal civil rights laws and does not discriminate on the basis of race, color, national origin, age, disability, or sex.

## 2017-03-07 ENCOUNTER — OFFICE VISIT (OUTPATIENT)
Dept: INTERNAL MEDICINE | Facility: CLINIC | Age: 32
End: 2017-03-07
Payer: COMMERCIAL

## 2017-03-07 ENCOUNTER — TELEPHONE (OUTPATIENT)
Dept: INTERNAL MEDICINE | Facility: CLINIC | Age: 32
End: 2017-03-07

## 2017-03-07 VITALS
RESPIRATION RATE: 16 BRPM | TEMPERATURE: 98 F | BODY MASS INDEX: 32.14 KG/M2 | HEART RATE: 88 BPM | SYSTOLIC BLOOD PRESSURE: 126 MMHG | DIASTOLIC BLOOD PRESSURE: 82 MMHG | WEIGHT: 174.63 LBS | HEIGHT: 62 IN

## 2017-03-07 DIAGNOSIS — M54.50 ACUTE BILATERAL LOW BACK PAIN WITHOUT SCIATICA: Primary | ICD-10-CM

## 2017-03-07 PROCEDURE — 99213 OFFICE O/P EST LOW 20 MIN: CPT | Mod: S$GLB,,, | Performed by: INTERNAL MEDICINE

## 2017-03-07 PROCEDURE — 99999 PR PBB SHADOW E&M-EST. PATIENT-LVL III: CPT | Mod: PBBFAC,,, | Performed by: INTERNAL MEDICINE

## 2017-03-07 RX ORDER — TRAMADOL HYDROCHLORIDE 50 MG/1
50 TABLET ORAL EVERY 8 HOURS PRN
Qty: 30 TABLET | Refills: 0 | Status: SHIPPED | OUTPATIENT
Start: 2017-03-07 | End: 2017-03-17

## 2017-03-07 NOTE — MR AVS SNAPSHOT
[place] [facility]   [facility]  [place] [address]  Phone: [phone]  Fax: [phone]                  Harsha AGUILAR Asi   3/7/2017 2:40 PM   Office Visit    Descripción:  Female : 1985   Personal Médico:  Eunice Faustin MD   Departamento:  [place] [facility]           Razón de la erick     other     Back Pain           Diagnósticos de Esta Visita        Comentarios    Acute bilateral low back pain without sciatica    -  Primario            Lista de tareas           Citas próximas        Personal Médico Departamento Tfno del dpto    3/7/2017 3:30 PM Delgado Santiago PT [facility] [phone]      Metas (5 Years of Data)     Ninguna      Recetas para recoger        Disp Refills Start End    tramadol (ULTRAM) 50 mg tablet 30 tablet 0 3/7/2017 3/17/2017    Take 1 tablet (50 mg total) by mouth every 8 (eight) hours as needed for Pain. - Oral    Farmacia: [id number] - [place], [place] - [address] No. de tlfo: #: [phone]         [facility] [place]     [facility] [place] [facility] - Asistencia   Enfermeras registradas de [facility] pueden ayudarle a reservar fariba erick, proveer educación para la jay, asesoría clínica, y otros servicios de asesoramiento.   Llame para juan servicio gratuito a [phone].             Medicamentos           Mensaje sobre Medicamentos     Verificar los cambios y / o adiciones a santana régimen de medicación son los mismos que discutir con santana médico. Si cualquiera de estos cambios o adiciones son incorrectos, por favor notifique a santana proveedor de atención médica.        EMPEZAR a maria del carmen estos medicamentos NUEVOS        Refills    tramadol (ULTRAM) 50 mg tablet 0    Sig: Take 1 tablet (50 mg total) by mouth every 8 (eight) hours as needed for Pain.    Categoría: Phone In    Vía: Oral      DEJAR de maria del carmen estos medicamentos     drospirenone-ethinyl estradiol (RACHEL, 28,) 3-0.03 mg per tablet  "Take 1 tablet by mouth once daily.           Verifique que la siguiente lista de medicamentos es fariba representación exacta de los medicamentos que está tomando actualmente. Si no hay ningunos reportados, la lista puede estar en randolph. Si no es correcta, por favor póngase en contacto con santana proveedor de atención médica. Lleve esta lista con usted en liam de emergencia.           Medicamentos Actuales     diclofenac (VOLTAREN) 75 MG EC tablet Take 1 tablet (75 mg total) by mouth 2 (two) times daily as needed.    methocarbamol (ROBAXIN) 750 MG Tab Take 1 tablet (750 mg total) by mouth 2 (two) times daily as needed.    tramadol (ULTRAM) 50 mg tablet Take 1 tablet (50 mg total) by mouth every 8 (eight) hours as needed for Pain.           Información de referencia clínica           Tomasa signos vitales laurel     PS Pulso Temperatura Resp Arch [place]    126/82 (BP Location: Left arm, Patient Position: Sitting, BP Method: Manual) 88 98.2 °F (36.8 °C) (Oral) 16 5' 2" (1.575 m) 79.2 kg (174 lb 9.7 oz)    Ultima menstruación BMI ([facility])                02/28/2017 31.94 kg/m2          Blood Pressure          Most Recent Value    BP  126/82      Alergias     A partir del:  3/7/2017        No Known Allergies      Vacunas     Administradas en la fecha de la visita:  3/7/2017        None      Instrucciones    Therma Care heat patches as needed or generic       Language Assistance Services     ATTENTION: Language assistance services are available, free of charge. Please call [phone].      ATENCIÓN: Si habla español, tiene a santana disposición servicios gratuitos de asistencia lingüística. Llame al [phone].     JUSTIN Ý: N?u b?n nói Ti?ng Vi?t, có các d?ch v? h? tr? ngôn ng? mi?n phí dành cho b?n. G?i s? [phone].         [place] [facility] cumple con las leyes federales aplicables de derechos civiles y no discrimina por motivos de angy, color, origen nacional, edad, discapacidad, o sexo.                 Harsha M Asi "   3/7/2017 2:40 PM   Office Visit    Description:  Female : 1985   Provider:  Eunice Faustin MD   Department:  [place] [facility]           Reason for Visit     other     Back Pain           Diagnoses this Visit        Comments    Acute bilateral low back pain without sciatica    -  Primary            To Do List           Future Appointments        Provider Department Dept Phone    3/7/2017 3:30 PM Brenden Jack, PT [facility] [phone]      Goals     None       These Medications        Disp Refills Start End    tramadol (ULTRAM) 50 mg tablet 30 tablet 0 3/7/2017 3/17/2017    Take 1 tablet (50 mg total) by mouth every 8 (eight) hours as needed for Pain. - Oral    Pharmacy: [facility] - [address] #: [phone]         [facility]     [facility] Nurse Care [facility] -  Assistance  Registered nurses in the [facility] provide clinical advisement, health education, appointment booking, and other advisory services.  Call for this free service at [phone].             Medications           Message regarding Medications     Verify the changes and/or additions to your medication regime listed below are the same as discussed with your clinician today.  If any of these changes or additions are incorrect, please notify your healthcare provider.        START taking these NEW medications        Refills    tramadol (ULTRAM) 50 mg tablet 0    Sig: Take 1 tablet (50 mg total) by mouth every 8 (eight) hours as needed for Pain.    Class: Phone In    Route: Oral      STOP taking these medications     drospirenone-ethinyl estradiol (RACHEL, 28,) 3-0.03 mg per tablet Take 1 tablet by mouth once daily.           Verify that the below list of medications is an accurate representation of the medications you are currently taking.  If none reported, the list may be blank. If incorrect, please contact your healthcare  "provider. Carry this list with you in case of emergency.           Current Medications     diclofenac (VOLTAREN) 75 MG EC tablet Take 1 tablet (75 mg total) by mouth 2 (two) times daily as needed.    methocarbamol (ROBAXIN) 750 MG Tab Take 1 tablet (750 mg total) by mouth 2 (two) times daily as needed.    tramadol (ULTRAM) 50 mg tablet Take 1 tablet (50 mg total) by mouth every 8 (eight) hours as needed for Pain.           Clinical Reference Information           Your Vitals Were     BP Pulse Temp Resp Height Weight    126/82 (BP Location: Left arm, Patient Position: Sitting, BP Method: Manual) 88 98.2 °F (36.8 °C) (Oral) 16 5' 2" (1.575 m) 79.2 kg (174 lb 9.7 oz)    Last Period BMI                02/28/2017 31.94 kg/m2          Blood Pressure          Most Recent Value    BP  126/82      Allergies as of 3/7/2017     No Known Allergies      Immunizations Administered on Date of Encounter - 3/7/2017     None      Instructions    Therma Care heat patches as needed or generic       Language Assistance Services     ATTENTION: Language assistance services are available, free of charge. Please call [phone].      ATENCIÓN: Si habla padmini, tiene a santana disposición servicios gratuitos de asistencia lingüística. Llame al [phone].     JUSTIN Ý: N?u b?n nói Ti?ng Vi?t, có các d?ch v? h? tr? ngôn ng? mi?n phí dành cho b?n. G?i s? [phone].         [place] [facility] complies with applicable Federal civil rights laws and does not discriminate on the basis of race, color, national origin, age, disability, or sex.          "

## 2017-03-07 NOTE — PROGRESS NOTES
Subjective:       Patient ID: Harsha Castellanos is a 31 y.o. female who presents for other and Back Pain      Low-back Pain   This is a new problem. The current episode started 1 to 4 weeks ago (went to ER 2/26, was seen in clinic on 2/27). The problem occurs intermittently. The problem has been waxing and waning. Pertinent negatives include no abdominal pain, arthralgias, chest pain, chills, congestion, coughing, fatigue, fever, headaches, myalgias, nausea, numbness, rash, vomiting or weakness. The symptoms are aggravated by bending and walking. She has tried NSAIDs (Voltaren) for the symptoms. The treatment provided moderate relief.      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 Tried using a product from another [place]- [employer]- diclofenac + various vitamins      Review of Systems   Constitutional: Negative for chills, fatigue and fever.   HENT: Negative for congestion, dental problem, mouth sores, rhinorrhea and sinus pressure.    Eyes: Negative for visual disturbance.   Respiratory: Negative for cough, shortness of breath and wheezing.    Cardiovascular: Negative for chest pain, palpitations and leg swelling.   Gastrointestinal: Negative for abdominal pain, blood in stool, constipation, diarrhea, nausea and vomiting.   Genitourinary: Negative for dysuria, flank pain and hematuria.   Musculoskeletal: Negative for arthralgias and myalgias.   Skin: Negative for rash.   Neurological: Negative for dizziness, weakness, light-headedness, numbness and headaches.   Hematological: Negative for adenopathy. Does not bruise/bleed easily.       Objective:      Physical Exam   Constitutional: She is oriented to person, place, and time. Vital signs are normal. She appears well-developed and well-nourished. No distress.   HENT:   Head: Normocephalic and atraumatic.   Right Ear: Hearing and external ear normal.   Left Ear: Hearing  and external ear normal.   Nose: Nose normal.   Mouth/Throat: Uvula is midline, oropharynx is clear and moist and mucous membranes are normal.   Eyes: EOM and lids are normal.   Neck: Normal range of motion. Neck supple.   Cardiovascular: Normal rate, regular rhythm, normal heart sounds and intact distal pulses.    No murmur heard.  Pulmonary/Chest: Effort normal and breath sounds normal. She has no wheezes.   Abdominal: Soft. Bowel sounds are normal. She exhibits no distension.   Musculoskeletal: She exhibits no edema.        Cervical back: She exhibits no bony tenderness.        Thoracic back: She exhibits no tenderness and no pain.        Lumbar back: She exhibits tenderness (lateral to spine).   Neurological: She is alert and oriented to person, place, and time. She displays no tremor. No sensory deficit.   Skin: Skin is warm, dry and intact. No rash noted. She is not diaphoretic.   Psychiatric: She has a normal mood and affect.   Vitals reviewed.      Assessment:       1. 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

## 2017-04-07 ENCOUNTER — OFFICE VISIT (OUTPATIENT)
Dept: INTERNAL MEDICINE | Facility: CLINIC | Age: 32
End: 2017-04-07
Payer: COMMERCIAL

## 2017-04-07 VITALS
TEMPERATURE: 98 F | RESPIRATION RATE: 16 BRPM | HEIGHT: 62 IN | SYSTOLIC BLOOD PRESSURE: 106 MMHG | DIASTOLIC BLOOD PRESSURE: 68 MMHG | WEIGHT: 170.44 LBS | HEART RATE: 72 BPM | BODY MASS INDEX: 31.36 KG/M2

## 2017-04-07 DIAGNOSIS — G47.00 INSOMNIA, UNSPECIFIED TYPE: ICD-10-CM

## 2017-04-07 DIAGNOSIS — R51.9 ACUTE NONINTRACTABLE HEADACHE, UNSPECIFIED HEADACHE TYPE: Primary | ICD-10-CM

## 2017-04-07 DIAGNOSIS — M54.50 ACUTE MIDLINE LOW BACK PAIN WITHOUT SCIATICA: ICD-10-CM

## 2017-04-07 PROCEDURE — 99214 OFFICE O/P EST MOD 30 MIN: CPT | Mod: S$GLB,,, | Performed by: INTERNAL MEDICINE

## 2017-04-07 PROCEDURE — 99999 PR PBB SHADOW E&M-EST. PATIENT-LVL III: CPT | Mod: PBBFAC,,, | Performed by: INTERNAL MEDICINE

## 2017-04-07 RX ORDER — ALPRAZOLAM 0.25 MG/1
0.25 TABLET ORAL NIGHTLY PRN
Qty: 30 TABLET | Refills: 0 | Status: SHIPPED | OUTPATIENT
Start: 2017-04-07 | End: 2017-05-07

## 2017-04-07 RX ORDER — BUTALBITAL, ACETAMINOPHEN AND CAFFEINE 50; 325; 40 MG/1; MG/1; MG/1
1 TABLET ORAL EVERY 4 HOURS PRN
Qty: 30 TABLET | Refills: 0 | Status: SHIPPED | OUTPATIENT
Start: 2017-04-07 | End: 2017-05-07

## 2017-04-07 NOTE — PROGRESS NOTES
Subjective:       Patient ID: Dhyaemy Castellanos is a 31 y.o. female who presents for Headache (form need to be filled out); Insomnia; and Back Pain      Headache    This is a new problem. The current episode started in the past 7 days. The problem occurs daily. The problem has been unchanged. The pain is located in the vertex region. The pain does not radiate. The quality of the pain is described as aching. The pain is moderate. Associated symptoms include back pain (significantly improved) and insomnia. Pertinent negatives include no abdominal pain, blurred vision, coughing, dizziness, ear pain, eye pain, fever, hearing loss, muscle aches, nausea, neck pain, numbness, phonophobia, photophobia, rhinorrhea, sinus pressure, swollen glands, tingling, vomiting or weakness. Nothing aggravates the symptoms. She has tried NSAIDs (took ibuprofen 600mg) for the symptoms. The treatment provided no relief. There is no history of cluster headaches, migraine headaches or recent head traumas.   Back Pain   This is a recurrent problem. The current episode started 1 to 4 weeks ago. The problem has been gradually improving since onset. The pain is present in the lumbar spine. The quality of the pain is described as aching. The pain does not radiate. The patient is experiencing no pain. The symptoms are aggravated by bending. Associated symptoms include headaches. Pertinent negatives include no abdominal pain, chest pain, fever, numbness, tingling or weakness. She has tried analgesics, muscle relaxant and home exercises (completed physical therapy) for the symptoms. The treatment provided significant relief.     Reports difficulty sleeping, awakens multiple times, feels headaches keep her up at night. Patient reports being under a lot of stress lately and feels as if headaches are stress related. Denies dysphoric mood or panic attacks.     Episode of hypotension with BP in 90/60's with dizziness that resolved with drinking something and  eating candy. Reports not drinking much water in the days prior when she felt as if she were coming down with cold- fever to 102, chills, post-nasal drip and cough but all symptoms resolved.       Review of Systems   Constitutional: Negative for chills, fatigue and fever.   HENT: Negative for congestion, ear pain, hearing loss, rhinorrhea, sinus pressure and trouble swallowing.    Eyes: Negative for blurred vision, photophobia, pain and visual disturbance.   Respiratory: Negative for cough, chest tightness and shortness of breath.    Cardiovascular: Negative for chest pain, palpitations and leg swelling.   Gastrointestinal: Negative for abdominal pain, diarrhea, nausea and vomiting.   Musculoskeletal: Positive for back pain (significantly improved). Negative for arthralgias, myalgias and neck pain.   Skin: Negative for rash.   Neurological: Positive for headaches. Negative for dizziness, tingling, facial asymmetry, speech difficulty, weakness, light-headedness and numbness.   Psychiatric/Behavioral: Positive for sleep disturbance. Negative for dysphoric mood. The patient has insomnia. The patient is not nervous/anxious.        Objective:      Physical Exam   Constitutional: She is oriented to person, place, and time. Vital signs are normal. She appears well-developed and well-nourished. No distress.   HENT:   Head: Normocephalic and atraumatic.   Right Ear: Hearing and external ear normal.   Left Ear: Hearing and external ear normal.   Nose: Nose normal.   Mouth/Throat: Uvula is midline and oropharynx is clear and moist. No oropharyngeal exudate.   Eyes: EOM and lids are normal.   Neck: Normal range of motion. Neck supple.   Cardiovascular: Normal rate, regular rhythm, normal heart sounds and intact distal pulses.    No murmur heard.  Pulmonary/Chest: Effort normal and breath sounds normal. She has no wheezes.   Abdominal: Soft. Bowel sounds are normal. She exhibits no distension.   Musculoskeletal: She exhibits no  edema.        Lumbar back: She exhibits no tenderness and no pain.   Neurological: She is alert and oriented to person, place, and time. Coordination and gait normal.   Skin: Skin is warm, dry and intact. No rash noted. She is not diaphoretic.   Psychiatric: She has a normal mood and affect.   Vitals reviewed.      Assessment:       1. Acute nonintractable headache, unspecified headache type    2. Insomnia, unspecified type    3. Acute midline low back pain without sciatica        Plan:       -- trial of Fioricet as needed for HA  -- given information about good sleep hygiene, try melatonin 5mg at bedtime as needed for sleep  -- if no improvement, may use Xanax 0.25mg at bedtime as needed for insomnia  -- back pain has improved, completed PT, completed form for patient's return to work without restriction  -- patient planning to move back to [place] soon    Eunice Faustin MD

## 2017-04-07 NOTE — PATIENT INSTRUCTIONS
May try Melatonin 5mg at bedtime    If no relief, may try Benadryl      Behavioral Modifications for Insomnia:     1. Implement stimulus control: [place] bedroom for sleep and intimacy only. Do not use electronics in bed. Leave bedroom when frustrated from not sleeping. Engage in relaxation before returning.    2. Avoid clock watching. Avoid thinking/worrying about sleep when trying to fall asleep.    3.  Maintain a regular sleep schedule: go to bed at the same time and wake up at the same time each day. Staying aying awake during the day helps you to fall asleep at night.     4. Limit caffeinated products. Caffeinated beverages and foods (coffee, tea, cola, chocolate) can cause difficulty falling asleep, awakenings during the night, and shallow sleep.  Even caffeine early in the day can disrupt nighttime sleep.    5. Make sure the bedroom is dark, quiet and cool. A comfortable, noise-free sleep environment will reduce the likelihood that you will wake up during the night.  Noise that does not awaken you may also disturb the quality of your sleep.  Carpeting, insulated curtains, and closing the door may help.    6. Exercise regularly. Increase physical activity but avoid strenuous exercise near bedtime. Schedule exercise times so that they do not occur within 3 hours of when you intend to go to bed.  Exercise makes it easier to initiate sleep and deepen sleep.    7. Eat regular meals and do not go to bed hungry. Hunger may disturb sleep.  A light snack at bedtime (especially carbohydrates) may help sleep, but avoid greasy or heavy foods.    8. Avoid excessive liquids in the evening. Reducing liquid intake will minimize the need for nighttime trips to the bathroom.

## 2017-04-07 NOTE — MR AVS SNAPSHOT
[place] [facility]   [facility]  [place] [address]  Phone: [phone]  Fax: [phone]                  Harsha AGUILAR Asi   2017 3:40 PM   Office Visit    Descripción:  Female : 1985   Personal Médico:  Eunice Faustin MD   Departamento:  [place] [facility]           Razón de la erick     Headache           Diagnósticos de Esta Visita        Comentarios    Acute nonintractable headache, unspecified headache type    -  Primario     Insomnia, unspecified type         Acute midline low back pain without sciatica                Lista de tareas           Metas (5 Years of Data)     Ninguna      Recetas para recoger        Disp Refills Start End    butalbital-acetaminophen-caffeine -40 mg (FIORICET, ESGIC) -40 mg per tablet 30 tablet 0 2017    Take 1 tablet by mouth every 4 (four) hours as needed for Pain or Headaches. - Oral    [facility]: [address] [place]PLA[place] No. de tlfo: #: [phone]       alprazolam (XANAX) 0.25 MG tablet 30 tablet 0 2017    Take 1 tablet (0.25 mg total) by mouth nightly as needed for Anxiety. - Oral    [facility]: [address] [place] [address] [place] No. de tlfo: #: [phone]         [facility] [place]     [facility] [place] - Asistencia   Enfermeras registradas de [facility] pueden ayudarle a reservar fariba erick, proveer educación para la jay, asesoría clínica, y otros servicios de asesoramiento.   Llame para juan servicio gratuito a [phone].             Medicamentos           Mensaje sobre Medicamentos     Verificar los cambios y / o adiciones a santana régimen de medicación son los mismos que discutir con santana médico. Si cualquiera de estos cambios o adiciones son incorrectos, por favor notifique a santana proveedor de atención médica.        EMPEZAR a maria del carmen estos medicamentos NUEVOS        Refills     "butalbital-acetaminophen-caffeine -40 mg (FIORICET, ESGIC) -40 mg per tablet 0    Sig: Take 1 tablet by mouth every 4 (four) hours as needed for Pain or Headaches.    Categoría: Normal    Vía: Oral    alprazolam (XANAX) 0.25 MG tablet 0    Sig: Take 1 tablet (0.25 mg total) by mouth nightly as needed for Anxiety.    Categoría: Phone In    Vía: Oral      DEJAR de maria del carmen estos medicamentos     diclofenac (VOLTAREN) 75 MG EC tablet Take 1 tablet (75 mg total) by mouth 2 (two) times daily as needed.           Verifique que la siguiente lista de medicamentos es fariba representación exacta de los medicamentos que está tomando actualmente. Si no hay ningunos reportados, la lista puede estar en randolph. Si no es correcta, por favor póngase en contacto con santana proveedor de atención médica. Lleve esta lista con usted en liam de emergencia.           Medicamentos Actuales     alprazolam (XANAX) 0.25 MG tablet Take 1 tablet (0.25 mg total) by mouth nightly as needed for Anxiety.    butalbital-acetaminophen-caffeine -40 mg (FIORICET, ESGIC) -40 mg per tablet Take 1 tablet by mouth every 4 (four) hours as needed for Pain or Headaches.           Información de referencia clínica           Tomasa signos vitales laurel     PS Pulso Temperatura Resp Aragon Peso    106/68 72 98.4 °F (36.9 °C) (Oral) 16 5' 2" (1.575 m) 77.3 kg (170 lb 6.7 oz)    Ultima [place]ación [place]                04/02/2017 31.17 kg/m2          Blood Pressure          Most Recent Value    BP  106/68      Alergias     A partir del:  4/7/2017        No Known Allergies      Vacunas     Administradas en la fecha de la visita:  4/7/2017        None      Instrucciones    May try Melatonin 5mg at bedtime    If no relief, may try Benadryl      Behavioral Modifications for Insomnia:     1. Implement stimulus control: [place] bedroom for sleep and intimacy only. Do not use electronics in bed. Leave bedroom when frustrated from not sleeping. Engage in " relaxation before returning.    2. Avoid clock watching. Avoid thinking/worrying about sleep when trying to fall asleep.    3.  Maintain a regular sleep schedule: go to bed at the same time and wake up at the same time each day. Staying aying awake during the day helps you to fall asleep at night.     4. Limit caffeinated products. Caffeinated beverages and foods (coffee, tea, cola, chocolate) can cause difficulty falling asleep, awakenings during the night, and shallow sleep.  Even caffeine early in the day can disrupt nighttime sleep.    5. Make sure the bedroom is dark, quiet and cool. A comfortable, noise-free sleep environment will reduce the likelihood that you will wake up during the night.  Noise that does not awaken you may also disturb the quality of your sleep.  Carpeting, insulated curtains, and closing the door may help.    6. Exercise regularly. Increase physical activity but avoid strenuous exercise near bedtime. Schedule exercise times so that they do not occur within 3 hours of when you intend to go to bed.  Exercise makes it easier to initiate sleep and deepen sleep.    7. Eat regular meals and do not go to bed hungry. Hunger may disturb sleep.  A light snack at bedtime (especially carbohydrates) may help sleep, but avoid greasy or heavy foods.    8. Avoid excessive liquids in the evening. Reducing liquid intake will minimize the need for nighttime trips to the bathroom.              Language Assistance Services     ATTENTION: Language assistance services are available, free of charge. Please call [phone].      ATENCIÓN: Si habla español, tiene a santana disposición servicios gratuitos de asistencia lingüística. Llame al [phone].     CHÚ Ý: N?u b?n nói Ti?ng Vi?t, có các d?ch v? h? tr? ngôn ng? mi?n phí dành cho b?n. G?i s? [phone].         [place] [facility] cumple con las leyes federales aplicables de derechos civiles y no discrimina por motivos de angy, color, origen  olegional, edad, discapacidad, o sexo.                 Dhyam M Asi   2017 3:40 PM   Office Visit    Description:  Female : 1985   Provider:  Eunice Faustin MD   Department:  [place] [facility]           Reason for Visit     Headache           Diagnoses this Visit        Comments    Acute nonintractable headache, unspecified headache type    -  Primary     Insomnia, unspecified type         Acute midline low back pain without sciatica                To Do List           Goals     None       These Medications        Disp Refills Start End    butalbital-acetaminophen-caffeine -40 mg (FIORICET, ESGIC) -40 mg per tablet 30 tablet 0 2017    Take 1 tablet by mouth every 4 (four) hours as needed for Pain or Headaches. - Oral    Pharmacy: [facility] [address] [place] [address] [address] #: [phone]       alprazolam (XANAX) 0.25 MG tablet 30 tablet 0 2017    Take 1 tablet (0.25 mg total) by mouth nightly as needed for Anxiety. - Oral    Pharmacy: [facility] [address] [place] [address] Ph #: [phone]         [facility]     [facility] Nurse Care Line -  Assistance  Unless otherwise directed by your provider, please contact [facility], our nurse care line that is available for  assistance.     Registered nurses in the [facility] provide: appointment scheduling, clinical advisement, health education, and other advisory services.  Call: [phone] (toll free)               Medications           Message regarding Medications     Verify the changes and/or additions to your medication regime listed below are the same as discussed with your clinician today.  If any of these changes or additions are incorrect, please notify your healthcare provider.        START taking these NEW medications        Refills    butalbital-acetaminophen-caffeine -40 mg (FIORICET, ESGIC) -40 mg per  "tablet 0    Sig: Take 1 tablet by mouth every 4 (four) hours as needed for Pain or Headaches.    Class: Normal    Route: Oral    alprazolam (XANAX) 0.25 MG tablet 0    Sig: Take 1 tablet (0.25 mg total) by mouth nightly as needed for Anxiety.    Class: Phone In    Route: Oral      STOP taking these medications     diclofenac (VOLTAREN) 75 MG EC tablet Take 1 tablet (75 mg total) by mouth 2 (two) times daily as needed.           Verify that the below list of medications is an accurate representation of the medications you are currently taking.  If none reported, the list may be blank. If incorrect, please contact your healthcare provider. Carry this list with you in case of emergency.           Current Medications     alprazolam (XANAX) 0.25 MG tablet Take 1 tablet (0.25 mg total) by mouth nightly as needed for Anxiety.    butalbital-acetaminophen-caffeine -40 mg (FIORICET, ESGIC) -40 mg per tablet Take 1 tablet by mouth every 4 (four) hours as needed for Pain or Headaches.           Clinical Reference Information           Your Vitals Were     BP Pulse Temp Resp Height Weight    106/68 72 98.4 °F (36.9 °C) (Oral) 16 5' 2" (1.575 m) 77.3 kg (170 lb 6.7 oz)    Last Period BMI                04/02/2017 31.17 kg/m2          Blood Pressure          Most Recent Value    BP  106/68      Allergies as of 4/7/2017     No Known Allergies      Immunizations Administered on Date of Encounter - 4/7/2017     None      Instructions    May try Melatonin 5mg at bedtime    If no relief, may try Benadryl      Behavioral Modifications for Insomnia:     1. Implement stimulus control: [place] bedroom for sleep and intimacy only. Do not use electronics in bed. Leave bedroom when frustrated from not sleeping. Engage in relaxation before returning.    2. Avoid clock watching. Avoid thinking/worrying about sleep when trying to fall asleep.    3.  Maintain a regular sleep schedule: go to bed at the same time and wake up at the same " time each day. Staying aying awake during the day helps you to fall asleep at night.     4. Limit caffeinated products. Caffeinated beverages and foods (coffee, tea, cola, chocolate) can cause difficulty falling asleep, awakenings during the night, and shallow sleep.  Even caffeine early in the day can disrupt nighttime sleep.    5. Make sure the bedroom is dark, quiet and cool. A comfortable, noise-free sleep environment will reduce the likelihood that you will wake up during the night.  Noise that does not awaken you may also disturb the quality of your sleep.  Carpeting, insulated curtains, and closing the door may help.    6. Exercise regularly. Increase physical activity but avoid strenuous exercise near bedtime. Schedule exercise times so that they do not occur within 3 hours of when you intend to go to bed.  Exercise makes it easier to initiate sleep and deepen sleep.    7. Eat regular meals and do not go to bed hungry. Hunger may disturb sleep.  A light snack at bedtime (especially carbohydrates) may help sleep, but avoid greasy or heavy foods.    8. Avoid excessive liquids in the evening. Reducing liquid intake will minimize the need for nighttime trips to the bathroom.              Language Assistance Services     ATTENTION: Language assistance services are available, free of charge. Please call [phone].      ATENCIÓN: Si farzaneh [place], tiene a santana disposición servicios gratuitos de asistencia lingüística. Llame al [phone].     JUSTIN Ý: N?u b?n nói Ti?ng Vi?t, có các d?ch v? h? tr? ngôn ng? mi?n phí dành cho b?n. G?i s? [phone].         [place] [facility] complies with applicable Federal civil rights laws and does not discriminate on the basis of race, color, national origin, age, disability, or sex.